# Patient Record
Sex: MALE | Race: WHITE | Employment: FULL TIME | URBAN - METROPOLITAN AREA
[De-identification: names, ages, dates, MRNs, and addresses within clinical notes are randomized per-mention and may not be internally consistent; named-entity substitution may affect disease eponyms.]

---

## 2017-10-19 ENCOUNTER — OFFICE VISIT (OUTPATIENT)
Dept: ORTHOPEDIC SURGERY | Age: 37
End: 2017-10-19

## 2017-10-19 VITALS — WEIGHT: 167.99 LBS | HEIGHT: 69 IN | BODY MASS INDEX: 24.88 KG/M2

## 2017-10-19 DIAGNOSIS — T14.8XXA CRUSH INJURY: ICD-10-CM

## 2017-10-19 DIAGNOSIS — S70.12XA CONTUSION OF LEFT THIGH, INITIAL ENCOUNTER: Primary | ICD-10-CM

## 2017-10-19 PROCEDURE — L1830 KO IMMOB CANVAS LONG PRE OTS: HCPCS | Performed by: PHYSICIAN ASSISTANT

## 2017-10-19 PROCEDURE — 99203 OFFICE O/P NEW LOW 30 MIN: CPT | Performed by: PHYSICIAN ASSISTANT

## 2017-10-19 NOTE — PROGRESS NOTES
Cigarettes. He has been smoking about 0.50 packs per day. He does not have any smokeless tobacco history on file. He reports that he drinks about 3.6 oz of alcohol per week . He reports that he does not use drugs. Family History:   No family history on file. REVIEW OF SYSTEMS:   For new problems, a full review of systems will be found scanned in the patient's chart. CONSTITUTIONAL: Denies unexplained weight loss, fevers, chills   NEUROLOGICAL: Denies unsteady gait or progressive weakness  SKIN: Denies skin changes, delayed healing, rash, itching       PHYSICAL EXAM:    Vitals: Height 5' 9.02\" (1.753 m), weight 167 lb 15.9 oz (76.2 kg). GENERAL EXAM:  · General Apparence: Patient is adequately groomed with no evidence of malnutrition. · Orientation: The patient is oriented to time, place and person. · Mood & Affect:The patient's mood and affect are appropriate       LEFT upper leg PHYSICAL EXAMINATION:  · Inspection:  Is significant swelling of the LEFT upper leg from the groin to the knee, including a knee effusion. There is ecchymosis through the medial aspect of the upper leg and anterior aspect. No abrasion    · Palpation:  The patient is exquisitely tender primarily at the anterior aspect of the proximal mid femur extending to the distal femur, no significant medial or lateral joint line tenderness at the knee. No significant groin tenderness today, the low back is minimally tender on the LEFT      · Range of Motion: He is very limited today with range of motion but can perform a little bit of hip flexion. He cannot perform an independent straight leg raise today secondary to pain    · Strength: Significantly limited secondary to pain, unable to fully assess but there are moderate deficits with hip flexion and knee extension    · Special Tests:  Negative Homans sign, negative Gonsalez test.  Pedal pulses palpable and 2+            · Skin:  There are no rashes, ulcerations or lesions.     · Gait & station: Antalgic favoring the LEFT upper leg      · Additional Examinations: And examination of the contralateral leg does not demonstrate any deformity or injury, strength and tone are normal      Diagnostic Testing:      Views:  4   Location:  LEFT femur   Findings:  Soft tissue swelling is present but I do not notify any definitive fracture    Orders   No orders of the defined types were placed in this encounter. Assessment / Treatment Plan:     1. Significant contusionLEFT femur with possible muscular or tendinous injury    The patient sustained a crush injury to his LEFT upper leg. I recommended an MRI scan of the LEFT femur to rule out a musculoskeletal injury given his deficits with range of motion. We are going to use a knee immobilizer, Ace wrap for compression and he will continue with anti-inflammatory medications and protected weightbearing. He will follow-up with the results of the MRI scan. Obviously he will not be working until we are able to review the results of the scan.

## 2017-10-25 ENCOUNTER — TELEPHONE (OUTPATIENT)
Dept: ORTHOPEDIC SURGERY | Age: 37
End: 2017-10-25

## 2017-10-25 NOTE — TELEPHONE ENCOUNTER
CALLED LVM FOR PATIENT TODAY, STATING MRI IS APPROVED FOR [email or web address], & LEFT # FOR THEM TO CALL & SCHEDULE THAT. & TO MAKE AN FOLLOW UP APPT W/ DR. PUGH AFTER MRI.  LNA

## 2017-11-07 ENCOUNTER — OFFICE VISIT (OUTPATIENT)
Dept: ORTHOPEDIC SURGERY | Age: 37
End: 2017-11-07

## 2017-11-07 VITALS
HEIGHT: 69 IN | DIASTOLIC BLOOD PRESSURE: 90 MMHG | BODY MASS INDEX: 24.88 KG/M2 | SYSTOLIC BLOOD PRESSURE: 148 MMHG | WEIGHT: 167.99 LBS | HEART RATE: 73 BPM

## 2017-11-07 DIAGNOSIS — M25.562 LEFT KNEE PAIN, UNSPECIFIED CHRONICITY: ICD-10-CM

## 2017-11-07 DIAGNOSIS — T14.8XXA HEMATOMA: Primary | ICD-10-CM

## 2017-11-07 PROCEDURE — 99214 OFFICE O/P EST MOD 30 MIN: CPT | Performed by: PHYSICIAN ASSISTANT

## 2017-11-07 PROCEDURE — MISC250 COMPRESSION STOCKING: Performed by: PHYSICIAN ASSISTANT

## 2017-11-07 RX ORDER — INDOMETHACIN 50 MG/1
50 CAPSULE ORAL 3 TIMES DAILY
Qty: 60 CAPSULE | Refills: 2 | Status: SHIPPED | OUTPATIENT
Start: 2017-11-07

## 2017-11-09 ENCOUNTER — TELEPHONE (OUTPATIENT)
Dept: ORTHOPEDIC SURGERY | Age: 37
End: 2017-11-09

## 2017-11-15 ENCOUNTER — HOSPITAL ENCOUNTER (OUTPATIENT)
Dept: PHYSICAL THERAPY | Age: 37
Discharge: HOME | End: 2017-11-30
Admitting: ORTHOPAEDIC SURGERY

## 2017-11-15 NOTE — PLAN OF CARE
[facility], [address] [facility]  [address] [place] [address]  Phone: [phone]  Fax [phone]     Physical Therapy Certification    Dear Referring Practitioner: Rowena Laird,    We had the pleasure of evaluating the following patient for physical therapy services at [address] [place] [address]. A summary of our findings can be found in the initial assessment below. This includes our plan of care. If you have any questions or concerns regarding these findings, please do not hesitate to contact me at the office phone number checked above. Thank you for the referral.       Physician Signature:_______________________________Date:__________________  By signing above (or electronic signature), therapists plan is approved by physician    Patient: Rajan Velasco   : 1980   MRN: [id number]  Referring Physician: Referring Practitioner: Rowena Laird      Evaluation Date: 11/15/2017      Medical Diagnosis Information:  Diagnosis: Left knee hematoma (T14.8XXD), Left knee pain (M25.561)   Treatment Diagnosis: Left knee pain (M25.562)                                         Insurance information: PT Insurance Information: [facility]     Precautions/ Contra-indications: Hematoma left thigh  Latex Allergy:  [x]NO      []YES  Preferred Language for Healthcare:   [x]English       []other:    SUBJECTIVE: Patient stated complaint: Patient reports he injured his left leg in Mid October when he was at work and got sandwiched between two 4000 lb rolls. MRI showed a hematoma and quad strain. Getting an MRI of his knee on Friday. Had a lot of swelling throughout his entire leg, but it has improved. Biggest complaint is discomfort and pain at the top of the knee and into the thigh. No radiating. Feels stiffness in the morning.      Relevant Medical History:No significant PMH  Functional Disability Index:PT G-Codes  Functional Assessment Tool Used: LEFS  Score: 65%  Functional Limitation: Mobility: Walking and moving around  Mobility: Walking and Moving Around Current Status (): At least 60 percent but less than 80 percent impaired, limited or restricted  Mobility: Walking and Moving Around Goal Status (): At least 40 percent but less than 60 percent impaired, limited or restricted    Pain Scale: 6/10  Easing factors: Ice, ibuprofen  Provocative factors: bending knee, straightening, knee, walking, stairs     Type: [x]Constant   []Intermittent  []Radiating []Localized []other:     Numbness/Tingling: occasional present into lower leg depending on what position or chair he sits in. Goes away once he gets up and moves. Occupation/School:     Living Status/Prior Level of Function: Independent with ADLs and IADLs, lifts weights on occasion, does some walking    OBJECTIVE:     ROM LEFT RIGHT   HIP Flex     HIP Abd     HIP Ext     HIP IR     HIP ER     Knee ext -4    Knee Flex 58    Ankle PF     Ankle DF     Ankle In     Ankle Ev     Strength  LEFT RIGHT   HIP Flexors     HIP Abductors     HIP Ext     Hip ER     Knee EXT (quad) Fair -    Knee Flex (HS)     Ankle DF     Ankle PF     Ankle Inv     Ankle EV          Circumference  Mid apex  7 cm prox             Reflexes/Sensation:    []Dermatomes/Myotomes intact    [x]Reflexes equal and normal bilaterally   []Other:    Joint mobility:    []Normal    [x]Hypo   []Hyper    Palpation: TTP mid to distal quad, quad tendon, patellar tendon    Functional Mobility/Transfers: Independent    Posture: Fair    Bandages/Dressings/Incisions: None this date    Gait: (include devices/WB status) Ambulates with left antalgic gait pattern with knee in slight flexion. Orthopedic Special Tests: patellar grind -                       [x] Patient history, allergies, meds reviewed. Medical chart reviewed. See intake form.      Review Of Systems (ROS):  [x]Performed Review of systems (Integumentary, CardioPulmonary, Neurological) by intake and observation. Intake form has been scanned into medical record. Patient has been instructed to contact their primary care physician regarding ROS issues if not already being addressed at this time. Co-morbidities/Complexities (which will affect course of rehabilitation):   [x]None           Arthritic conditions   []Rheumatoid arthritis (M05.9)  []Osteoarthritis (M19.91)   Cardiovascular conditions   []Hypertension (I10)  []Hyperlipidemia (E78.5)  []Angina pectoris (I20)  []Atherosclerosis (I70)   Musculoskeletal conditions   []Disc pathology   []Congenital spine pathologies   []Prior surgical intervention  []Osteoporosis (M81.8)  []Osteopenia (M85.8)   Endocrine conditions   []Hypothyroid (E03.9)  []Hyperthyroid Gastrointestinal conditions   []Constipation (L55.29)   Metabolic conditions   []Morbid obesity (E66.01)  []Diabetes type 1(E10.65) or 2 (E11.65)   []Neuropathy (G60.9)     Pulmonary conditions   []Asthma (J45)  []Coughing   []COPD (J44.9)   Psychological Disorders  []Anxiety (F41.9)  []Depression (F32.9)   []Other:   []Other:          Barriers to/and or personal factors that will affect rehab potential:              []Age  []Sex              []Motivation/Lack of Motivation                        []Co-Morbidities              []Cognitive Function, education/learning barriers              []Environmental, home barriers              []profession/work barriers  []past PT/medical experience  []other:  Justification: Patient is healthy and in pretty good shape. Does smoke, which could slow healing. Falls Risk Assessment (30 days):   [x] Falls Risk assessed and no intervention required.   [] Falls Risk assessed and Patient requires intervention due to being higher risk   TUG score (>12s at risk):     [] Falls education provided, including       G-Codes:  PT G-Codes  Functional Assessment Tool Used: LEFS  Score: 65%  Functional Limitation: Mobility: Walking and moving

## 2017-11-21 ENCOUNTER — HOSPITAL ENCOUNTER (OUTPATIENT)
Dept: PHYSICAL THERAPY | Age: 37
Discharge: HOME | End: 2017-11-21
Admitting: ORTHOPAEDIC SURGERY

## 2017-11-21 NOTE — FLOWSHEET NOTE
[facility], [address]      Physical Therapy  Cancellation/No-show Note  Patient Name:  Ottis Bosworth  :  1980   Date:  2017  Cancelled visits to date:  0  No-shows to date: 1 ()    For today's appointment patient:  []  Cancelled  []  Rescheduled appointment  [x]  No-show     Reason given by patient:  []  Patient ill  []  Conflicting appointment  []  No transportation    []  Conflict with work  []  No reason given  [x]  Other: no call no show    Comments:      Electronically signed by:  Carin Eckert PT

## 2017-11-21 NOTE — FLOWSHEET NOTE
[facility], [address] [place]  Phone: [phone]  Fax [phone]    Physical Therapy Daily Treatment Note  Date:  2017    Patient Name:  David Palencia    :  1980  MRN: [id number]  Restrictions/Precautions:    Medical/Treatment Diagnosis Information:  · Diagnosis: Left knee hematoma (T14.8XXD), Left knee pain (M25.561)  · Treatment Diagnosis: Left knee pain (R37.852)  Insurance/Certification information:  PT Insurance Information: [facility]  Physician Information:  Referring Practitioner: Jimi Lea of care signed (Y/N):     Date of Patient follow up with Physician:     G-Code (if applicable):      Date G-Code Applied:         Progress Note: [x]  Yes  []  No  Next due by: Visit #10       Latex Allergy:  [x]NO      []YES  Preferred Language for Healthcare:   [x]English       []other:    Visit # Insurance Allowable   2 12 visits      Pain level:  4/10     SUBJECTIVE:  \"Got MRI done need to get scheduled to get it read with MD.  Started to do stairs. \"    OBJECTIVE: 27' late for appointment   Observation: tight and hardened quad muscle, floating patella.   Test measurements:  Heel slide     RESTRICTIONS/PRECAUTIONS:     Exercises/Interventions:     Therapeutic Ex Sets/sec Reps Notes   Seated HS stretch 30\" 3       Quad sets 10\" 10x       HL TA with ADD 10\" 10x    SB flexion rolls  30x    HL ADD with bridges 3\" 10    Seated SAQ 2\" 10    SLR ABD  12x                                  incline      bike      AT NV     Manual Intervention      STM quad, ITB x6'     Manual HS stretch 30\" 5                            NMR re-education                                              Therapeutic Exercise and NMR EXR  [x] (92680) Provided verbal/tactile cueing for activities related to strengthening, flexibility, endurance, ROM for improvements in LE, proximal hip, and core control with self care, mobility, lifting, ambulation.  [] (46763) Provided verbal/tactile cueing for activities related to improving balance, coordination, kinesthetic sense, posture, motor skill, proprioception  to assist with LE, proximal hip, and core control in self care, mobility, lifting, ambulation and eccentric single leg control. NMR and Therapeutic Activities:    [] (15420 or 22593) Provided verbal/tactile cueing for activities related to improving balance, coordination, kinesthetic sense, posture, motor skill, proprioception and motor activation to allow for proper function of core, proximal hip and LE with self care and ADLs  [] (79137) Gait Re-education- Provided training and instruction to the patient for proper LE, core and proximal hip recruitment and positioning and eccentric body weight control with ambulation re-education including up and down stairs     Home Exercise Program:    [x] (15605) Reviewed/Progressed HEP activities related to strengthening, flexibility, endurance, ROM of core, proximal hip and LE for functional self-care, mobility, lifting and ambulation/stair navigation   [] (79484)Reviewed/Progressed HEP activities related to improving balance, coordination, kinesthetic sense, posture, motor skill, proprioception of core, proximal hip and LE for self care, mobility, lifting, and ambulation/stair navigation      Manual Treatments:  PROM / STM / Oscillations-Mobs:  G-I, II, III, IV (PA's, Inf., Post.)  [] (92485) Provided manual therapy to mobilize LE, proximal hip and/or LS spine soft tissue/joints for the purpose of modulating pain, promoting relaxation,  increasing ROM, reducing/eliminating soft tissue swelling/inflammation/restriction, improving soft tissue extensibility and allowing for proper ROM for normal function with self care, mobility, lifting and ambulation.      Modalities:    Charges: 1:54-2:55  Timed Code Treatment Minutes: 50   Total Treatment Minutes: 65     [] EVAL (LOW) 19182 (typically 20 minutes face-to-face)  [] EVAL (MOD) 53658 (typically 30 minutes face-to-face)  [] EVAL (HIGH) 44901 (typically 45 minutes face-to-face)  [] RE-EVAL     [x] VV(70014) x  2 (2:05-2:40)  [] IONTO  [] NMR (79453) x      [] VASO  [x] Manual (95888) x  1(1:55-2:05)    [] Other:  [] TA x       [] Mech Traction (64769)  [] ES(attended) (29877)      [x] ES (un) (64472): (2:40-2:55)    GOALS:   Short Term Goals: To be achieved in: 2 weeks  1. Independent in HEP and progression per patient tolerance, in order to prevent re-injury. 2. Patient will have a decrease in pain to facilitate improvement in movement, function, and ADLs as indicated by Functional Deficits.     Long Term Goals: To be achieved in: 8 weeks  1. Disability index score of 30% or less for the LEFS to assist with reaching prior level of function. 2. Patient will demonstrate increased AROM to 0 - 125 degrees in the left knee to allow for proper joint functioning as indicated by patients Functional Deficits. 3. Patient will demonstrate an increase in Strength in left knee flexion and extension to 4+/5 to allow for proper functional mobility as indicated by patients Functional Deficits. 4. Patient will be able to ascend/descend a flight of stairs without pain. 5. Patient will demonstrate a normal gait pattern without pain. (patient specific functional goal)          Progression Towards Functional goals:  [x] Patient is progressing as expected towards functional goals listed. [] Progression is slowed due to complexities listed. [] Progression has been slowed due to co-morbidities. [] Plan just implemented, too soon to assess goals progression  [] Other:     ASSESSMENT:  Pt had better mobility of quad and knee ROM after manuals and ROM exercises.       Treatment/Activity Tolerance:  [x] Patient tolerated treatment well [] Patient limited by fatique  [] Patient limited by pain  [] Patient limited by other medical complications  [] Other:     Prognosis: [] Good [x] Fair  [] Poor    Patient Requires Follow-up: [x] Yes  [] No    PLAN: See eval for full POC; focus on quad contraction  [] Continue per plan of care [] Kaitlin Shields current plan (see comments)  [x] Plan of care initiated [] Hold pending MD visit [] Discharge    Electronically signed by: NELLIE Gregory,[id number]

## 2017-11-24 ENCOUNTER — HOSPITAL ENCOUNTER (OUTPATIENT)
Dept: PHYSICAL THERAPY | Age: 37
Discharge: HOME | End: 2017-11-24
Admitting: ORTHOPAEDIC SURGERY

## 2017-11-24 NOTE — FLOWSHEET NOTE
[] EVAL (LOW) 33107 (typically 20 minutes face-to-face)  [] EVAL (MOD) 97237 (typically 30 minutes face-to-face)  [] EVAL (HIGH) 23749 (typically 45 minutes face-to-face)  [] RE-EVAL     [x] JI(05371) x  2 (4:30-5:00)  [] IONTO  [] NMR (69292) x      [] VASO  [x] Manual (84011) x  14:15-4:30    [] Other:  [] TA x       [] Mech Traction (28411)  [] ES(attended) (61448)      [x] ES (un) (84941): (5:00-5:15 p)    GOALS:   Short Term Goals: To be achieved in: 2 weeks  1. Independent in HEP and progression per patient tolerance, in order to prevent re-injury. 2. Patient will have a decrease in pain to facilitate improvement in movement, function, and ADLs as indicated by Functional Deficits.     Long Term Goals: To be achieved in: 8 weeks  1. Disability index score of 30% or less for the LEFS to assist with reaching prior level of function. 2. Patient will demonstrate increased AROM to 0 - 125 degrees in the left knee to allow for proper joint functioning as indicated by patients Functional Deficits. 3. Patient will demonstrate an increase in Strength in left knee flexion and extension to 4+/5 to allow for proper functional mobility as indicated by patients Functional Deficits. 4. Patient will be able to ascend/descend a flight of stairs without pain. 5. Patient will demonstrate a normal gait pattern without pain. (patient specific functional goal)          Progression Towards Functional goals:  [x] Patient is progressing as expected towards functional goals listed. [] Progression is slowed due to complexities listed. [] Progression has been slowed due to co-morbidities. [] Plan just implemented, too soon to assess goals progression  [] Other:     ASSESSMENT: Pt did well with all TE but was fatigued at the end of session of table exercises which resulted in not initiating AT. Should be ready to initiate at next visit.     Treatment/Activity Tolerance:  [x] Patient tolerated treatment well [] Patient

## 2017-11-27 ENCOUNTER — HOSPITAL ENCOUNTER (OUTPATIENT)
Dept: PHYSICAL THERAPY | Age: 37
Discharge: HOME | End: 2017-11-27
Admitting: ORTHOPAEDIC SURGERY

## 2017-11-27 NOTE — FLOWSHEET NOTE
[facility], [address] [place]  Phone: [phone]  Fax [phone]    Physical Therapy Daily Treatment Note  Date:  2017    Patient Name:  Tino Mejia    :  1980  MRN: [id number]  Restrictions/Precautions:    Medical/Treatment Diagnosis Information:  · Diagnosis: Left knee hematoma (T14.8XXD), Left knee pain (M25.561)  · Treatment Diagnosis: Left knee pain (X94.684)  Insurance/Certification information:  PT Insurance Information: [facility]  Physician Information:  Referring Practitioner: Maribeth Beasley of care signed (Y/N):     Date of Patient follow up with Physician: MRI results to be read 17    G-Code (if applicable):      Date G-Code Applied:         Progress Note: []  Yes  [x]  No  Next due by: Visit #10       Latex Allergy:  [x]NO      []YES  Preferred Language for Healthcare:   [x]English       []other:    Visit # Insurance Allowable   4 12 visits      Pain level: 0 /10     SUBJECTIVE: \"Sore from being at [employer] yesterday. Tender medial quad and inner thigh. \"    OBJECTIVE:   Observation: HS tight, quad tight and congested entire area hip to knee  Test measurements:  None this visit    RESTRICTIONS/PRECAUTIONS:     Exercises/Interventions:     Therapeutic Ex Sets/sec Reps Notes   Warmup bike  5 min    Manuals      Quad sets 10\" 10x    Heel slides 10\" 10x       SB flexion rolls 10\" 15x    HL ADD with bridges 5\" 2x10    Seated SAQ 6\" bolster 5\" 2x10    SLR ABD 1 10    SLR FLX 3 5    Incline stretch 30\" x 3                            Bike FROM slow 5'          AT NV  Held this visit d/t soreness and fatigue with table activities   Manual Intervention      STM quad, ITB x8'     Manual HS stretch and quad stretch 5'                             NMR re-education      Tandem R/L fwd10\"5 each    SLS LLE 10\" 10    FSU with balance LLE on bottom step 15x3\"                           Therapeutic Exercise function with self care, mobility, lifting and ambulation. Modalities: IFC quad with CP x15' seated     Charges: 1:37-2:37  Timed Code Treatment Minutes: 45   Total Treatment Minutes: 60     [] EVAL (LOW) 58010 (typically 20 minutes face-to-face)  [] EVAL (MOD) 79900 (typically 30 minutes face-to-face)  [] EVAL (HIGH) 45878 (typically 45 minutes face-to-face)  [] RE-EVAL     [x] GJ(30709) x  1 (1:49-2:10)  [] IONTO  [x] NMR (54258) x  1 (2:10-2:18)  [] VASO  [x] Manual (96099) x  1(1:37-1:49)   [] Other:  [] TA x       [] Mech Traction (74658)  [] ES(attended) (38424)      [x] ES (un) (38963): (2:18-2:37)    GOALS:   Short Term Goals: To be achieved in: 2 weeks  1. Independent in HEP and progression per patient tolerance, in order to prevent re-injury. 2. Patient will have a decrease in pain to facilitate improvement in movement, function, and ADLs as indicated by Functional Deficits.     Long Term Goals: To be achieved in: 8 weeks  1. Disability index score of 30% or less for the LEFS to assist with reaching prior level of function. 2. Patient will demonstrate increased AROM to 0 - 125 degrees in the left knee to allow for proper joint functioning as indicated by patients Functional Deficits. 3. Patient will demonstrate an increase in Strength in left knee flexion and extension to 4+/5 to allow for proper functional mobility as indicated by patients Functional Deficits. 4. Patient will be able to ascend/descend a flight of stairs without pain. 5. Patient will demonstrate a normal gait pattern without pain. (patient specific functional goal)          Progression Towards Functional goals:  [x] Patient is progressing as expected towards functional goals listed. [] Progression is slowed due to complexities listed. [] Progression has been slowed due to co-morbidities.   [] Plan just implemented, too soon to assess goals progression  [] Other:     ASSESSMENT: Difficulty with SLR (shaky and fatigued)    Treatment/Activity Tolerance:  [x] Patient tolerated treatment well [] Patient limited by fatique  [] Patient limited by pain  [] Patient limited by other medical complications  [] Other:     Prognosis: [x] Good [] Fair  [] Poor    Patient Requires Follow-up: [x] Yes  [] No    PLAN: See eval for full POC;   [x] Continue per plan of care [] Alter current plan (see comments)  [] Plan of care initiated [] Hold pending MD visit [] Discharge    Electronically signed by: Avis Martines, ,[id number]

## 2017-11-30 ENCOUNTER — OFFICE VISIT (OUTPATIENT)
Dept: ORTHOPEDIC SURGERY | Age: 37
End: 2017-11-30

## 2017-11-30 ENCOUNTER — HOSPITAL ENCOUNTER (OUTPATIENT)
Dept: PHYSICAL THERAPY | Age: 37
Discharge: HOME | End: 2017-11-30
Admitting: ORTHOPAEDIC SURGERY

## 2017-11-30 VITALS
HEART RATE: 70 BPM | HEIGHT: 69 IN | BODY MASS INDEX: 24.88 KG/M2 | DIASTOLIC BLOOD PRESSURE: 80 MMHG | SYSTOLIC BLOOD PRESSURE: 137 MMHG | WEIGHT: 167.99 LBS

## 2017-11-30 DIAGNOSIS — M25.562 LEFT KNEE PAIN, UNSPECIFIED CHRONICITY: ICD-10-CM

## 2017-11-30 DIAGNOSIS — S70.12XA CONTUSION OF LEFT THIGH, INITIAL ENCOUNTER: ICD-10-CM

## 2017-11-30 DIAGNOSIS — T14.8XXA HEMATOMA: Primary | ICD-10-CM

## 2017-11-30 DIAGNOSIS — S80.02XA CONTUSION OF LEFT KNEE, INITIAL ENCOUNTER: ICD-10-CM

## 2017-11-30 PROCEDURE — 99213 OFFICE O/P EST LOW 20 MIN: CPT | Performed by: ORTHOPAEDIC SURGERY
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
## 2017-11-30 NOTE — FLOWSHEET NOTE
fwd10\"5 each    SLS LLE 10\" 10        Biphasic NMES 12'  10/10 1.0 ramp                   Therapeutic Exercise and NMR EXR  [x] (11138) Provided verbal/tactile cueing for activities related to strengthening, flexibility, endurance, ROM for improvements in LE, proximal hip, and core control with self care, mobility, lifting, ambulation.  [] (72158) Provided verbal/tactile cueing for activities related to improving balance, coordination, kinesthetic sense, posture, motor skill, proprioception  to assist with LE, proximal hip, and core control in self care, mobility, lifting, ambulation and eccentric single leg control.      NMR and Therapeutic Activities:    [] (97871 or 52713) Provided verbal/tactile cueing for activities related to improving balance, coordination, kinesthetic sense, posture, motor skill, proprioception and motor activation to allow for proper function of core, proximal hip and LE with self care and ADLs  [] (54997) Gait Re-education- Provided training and instruction to the patient for proper LE, core and proximal hip recruitment and positioning and eccentric body weight control with ambulation re-education including up and down stairs     Home Exercise Program:    [x] (25859) Reviewed/Progressed HEP activities related to strengthening, flexibility, endurance, ROM of core, proximal hip and LE for functional self-care, mobility, lifting and ambulation/stair navigation   [] (33447)Reviewed/Progressed HEP activities related to improving balance, coordination, kinesthetic sense, posture, motor skill, proprioception of core, proximal hip and LE for self care, mobility, lifting, and ambulation/stair navigation      Manual Treatments:  PROM / STM / Oscillations-Mobs:  G-I, II, III, IV (PA's, Inf., Post.)  [] (19979) Provided manual therapy to mobilize LE, proximal hip and/or LS spine soft tissue/joints for the purpose of modulating pain, promoting relaxation,  increasing ROM, reducing/eliminating soft

## 2017-12-01 ENCOUNTER — HOSPITAL ENCOUNTER (OUTPATIENT)
Dept: PHYSICAL THERAPY | Age: 37
Discharge: HOME | End: 2017-12-31
Attending: ORTHOPAEDIC SURGERY | Admitting: ORTHOPAEDIC SURGERY

## 2017-12-04 ENCOUNTER — HOSPITAL ENCOUNTER (OUTPATIENT)
Dept: PHYSICAL THERAPY | Age: 37
Discharge: HOME | End: 2017-12-04
Admitting: ORTHOPAEDIC SURGERY

## 2017-12-04 NOTE — FLOWSHEET NOTE
[facility], [address] [place]  Phone: [phone]  Fax [phone]    Physical Therapy Daily Treatment Note  Date:  2017    Patient Name:  Jessa Alonso    :  1980  MRN: [id number]  Restrictions/Precautions:    Medical/Treatment Diagnosis Information:  · Diagnosis: Left knee hematoma (T14.8XXD), Left knee pain (M25.561)  · Treatment Diagnosis: Left knee pain (B13.015)  Insurance/Certification information:  PT Insurance Information: [id number]  Physician Information:  Referring Practitioner: Donald Apple of care signed (Y/N): Y    Date of Patient follow up with Physician: 17    G-Code (if applicable):      Date G-Code Applied:         Progress Note: []  Yes  [x]  No  Next due by: Visit #10       Latex Allergy:  [x]NO      []YES  Preferred Language for Healthcare:   [x]English       []other:    Visit # Insurance Allowable   6 12 visits      Pain level: 0 /10     SUBJECTIVE: \"Doctor said my MRI was clear and no knee injury. May be released to work on the . \"    OBJECTIVE:   Observation:    Test measurements: SB FLX supine 125    RESTRICTIONS/PRECAUTIONS:     Exercises/Interventions:     Therapeutic Ex Sets/sec Reps Notes   Warmup bike  5 min    Seated HS stretch 30\" Manuals      Quad sets 5' with NMES  See below   HEP      SB flexion rolls 10\" 15x    HL ADD with bridges 5\" 3x10       SLR ABD with NMES 3'  See below   SLR FLX with NMES 5'  See below   Incline stretch 30\" x 3    Seated SAQ with NMES 5'  See below   SB bridges with full KE 5\" 3x10              Bike FROM slow 5'    Mini sqtas toucing bottom to wall for positioning Narrow/neutral/wide TRINH 10x each     AT Hold until NV    Manual Intervention      STM quad, ITB, patella 8'     Manual HS stretch and quad stretch 7'                             NMR re-education      Tandem R/L fwd 10\" 5 each    SLS LLE 10\" 10        Biphasic NMES 15'  10/10 1.0 ramp ASSESSMENT: Increased flexibility in quad, increased flexion ROM, decreased quad tissue congestion. Pt cont to fatigue following PT therex so AT was held this visit again. Treatment/Activity Tolerance:  [x] Patient tolerated treatment well [] Patient limited by fatique  [] Patient limited by pain  [] Patient limited by other medical complications  [] Other:     Prognosis: [x] Good [] Fair  [] Poor    Patient Requires Follow-up: [x] Yes  [] No    PLAN: See eval for full POC; AT NV if tolerated.   [x] Continue per plan of care [] Alter current plan (see comments)  [] Plan of care initiated [] Hold pending MD visit [] Discharge    Electronically signed by: NEDRA Sethi,[id number]

## 2017-12-08 ENCOUNTER — HOSPITAL ENCOUNTER (OUTPATIENT)
Dept: PHYSICAL THERAPY | Age: 37
Discharge: HOME | End: 2017-12-08
Admitting: ORTHOPAEDIC SURGERY

## 2017-12-08 NOTE — FLOWSHEET NOTE
[facility], [address] [place]  Phone: [phone]  Fax [phone]    Physical Therapy Daily Treatment Note  Date:  2017    Patient Name:  Ilsa Medellin    :  1980  MRN: [id number]  Restrictions/Precautions:    Medical/Treatment Diagnosis Information:  · Diagnosis: Left knee hematoma (T14.8XXD), Left knee pain (M25.561)  · Treatment Diagnosis: Left knee pain (X63.638)  Insurance/Certification information:  PT Insurance Information: [id number]  Physician Information:  Referring Practitioner: Lokesh Partida of care signed (Y/N): Y    Date of Patient follow up with Physician: 17    G-Code (if applicable):      Date G-Code Applied:         Progress Note: []  Yes  [x]  No  Next due by: Visit #10       Latex Allergy:  [x]NO      []YES  Preferred Language for Healthcare:   [x]English       []other:    Visit # Insurance Allowable   7 12 visits      Pain level: 0 /10     SUBJECTIVE:Pt states his knee/leg feel fine today. Has been resting a lot over the last few days.     OBJECTIVE:   Observation:    Test measurements: SB FLX supine 125    RESTRICTIONS/PRECAUTIONS:     Exercises/Interventions:     Therapeutic Ex Sets/sec Reps Notes   Warmup bike  5 min    Seated HS stretch 30\" 3       Quad sets 5\" x 15  See below   HEP      SB flexion rolls 10\" 15x    HL ADD with bridges 5\" 3x10       SLR ABD  1 10    SLR FLX  2 10    Incline stretch 30\" x 3        SB bridges with full KE 5\" 3x10        Prone TA w/ alt ext  10x ea    Bike FROM slow 5'    Mini sqtas toucing bottom to wall for positioning Narrow/neutral/wide TRINH 10x each     AT Hold until NV    Manual Intervention      STM quad, ITB, patella 8'     Manual HS stretch and quad stretch 4'                             NMR re-education      Tandem R/L fwd 10\" 5 each    SLS LLE 10\" 10                        Therapeutic Exercise and NMR EXR  [x] (97950) Provided verbal/tactile cueing for activities related to strengthening, flexibility, endurance, ROM for improvements in LE, proximal hip, and core control with self care, mobility, lifting, ambulation.  [] (75562) Provided verbal/tactile cueing for activities related to improving balance, coordination, kinesthetic sense, posture, motor skill, proprioception  to assist with LE, proximal hip, and core control in self care, mobility, lifting, ambulation and eccentric single leg control.      NMR and Therapeutic Activities:    [] (99332 or 87677) Provided verbal/tactile cueing for activities related to improving balance, coordination, kinesthetic sense, posture, motor skill, proprioception and motor activation to allow for proper function of core, proximal hip and LE with self care and ADLs  [] (45003) Gait Re-education- Provided training and instruction to the patient for proper LE, core and proximal hip recruitment and positioning and eccentric body weight control with ambulation re-education including up and down stairs     Home Exercise Program:    [x] (30602) Reviewed/Progressed HEP activities related to strengthening, flexibility, endurance, ROM of core, proximal hip and LE for functional self-care, mobility, lifting and ambulation/stair navigation   [] (03011)Reviewed/Progressed HEP activities related to improving balance, coordination, kinesthetic sense, posture, motor skill, proprioception of core, proximal hip and LE for self care, mobility, lifting, and ambulation/stair navigation      Manual Treatments:  PROM / STM / Oscillations-Mobs:  G-I, II, III, IV (PA's, Inf., Post.)  [] (28138) Provided manual therapy to mobilize LE, proximal hip and/or LS spine soft tissue/joints for the purpose of modulating pain, promoting relaxation,  increasing ROM, reducing/eliminating soft tissue swelling/inflammation/restriction, improving soft tissue extensibility and allowing for proper ROM for normal function with self care, mobility, lifting and ambulation. Modalities: ESU/CP x15' quad and knee HV     Charges: (2:06-3:21)  Timed Code Treatment Minutes: 60   Total Treatment Minutes: 75     [] EVAL (LOW) 31535 (typically 20 minutes face-to-face)  [] EVAL (MOD) 68880 (typically 30 minutes face-to-face)  [] EVAL (HIGH) 42542 (typically 45 minutes face-to-face)  [] RE-EVAL     [x] KG(26812) x  2 (9:25-10:00) [] IONTO  [x] NMR (00271) x  1 (9:12-9:25)  [] VASO  [x] Manual (67400) x  1(9:00am-9:12 am)  [] Other:  [] TA x       [] Mech Traction (39630)  [] ES(attended) (85773)      [x] ES (un) (05007):  (10-10:15)    GOALS:   Short Term Goals: To be achieved in: 2 weeks  1. Independent in HEP and progression per patient tolerance, in order to prevent re-injury. Met  2. Patient will have a decrease in pain to facilitate improvement in movement, function, and ADLs as indicated by Functional Deficits. Met     Long Term Goals: To be achieved in: 8 weeks  1. Disability index score of 30% or less for the LEFS to assist with reaching prior level of function. 2. Patient will demonstrate increased AROM to 0 - 125 degrees in the left knee to allow for proper joint functioning as indicated by patients Functional Deficits. Progress  3. Patient will demonstrate an increase in Strength in left knee flexion and extension to 4+/5 to allow for proper functional mobility as indicated by patients Functional Deficits. 4. Patient will be able to ascend/descend a flight of stairs without pain. 5. Patient will demonstrate a normal gait pattern without pain. (patient specific functional goal)   Progress       Progression Towards Functional goals:  [x] Patient is progressing as expected towards functional goals listed. [] Progression is slowed due to complexities listed. [] Progression has been slowed due to co-morbidities. [] Plan just implemented, too soon to assess goals progression  [] Other:     ASSESSMENT: Pt did well with SLR activities without NMES.   Progressing well towards goals.     Treatment/Activity Tolerance:  [x] Patient tolerated treatment well [] Patient limited by fatique  [] Patient limited by pain  [] Patient limited by other medical complications  [] Other:     Prognosis: [x] Good [] Fair  [] Poor    Patient Requires Follow-up: [x] Yes  [] No    PLAN: Cont to work with ATC  [x] Continue per plan of care [] Alter current plan (see comments)  [] Plan of care initiated [] Hold pending MD visit [] Discharge    Electronically signed by: Demond Nguyen, PT,DPT, [id number]
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
## 2017-12-12 ENCOUNTER — HOSPITAL ENCOUNTER (OUTPATIENT)
Dept: PHYSICAL THERAPY | Age: 37
Discharge: HOME | End: 2017-12-12
Admitting: ORTHOPAEDIC SURGERY

## 2017-12-12 NOTE — FLOWSHEET NOTE
improvements in LE, proximal hip, and core control with self care, mobility, lifting, ambulation.  [] (12461) Provided verbal/tactile cueing for activities related to improving balance, coordination, kinesthetic sense, posture, motor skill, proprioception  to assist with LE, proximal hip, and core control in self care, mobility, lifting, ambulation and eccentric single leg control. NMR and Therapeutic Activities:    [] (25490 or 41831) Provided verbal/tactile cueing for activities related to improving balance, coordination, kinesthetic sense, posture, motor skill, proprioception and motor activation to allow for proper function of core, proximal hip and LE with self care and ADLs  [] (47215) Gait Re-education- Provided training and instruction to the patient for proper LE, core and proximal hip recruitment and positioning and eccentric body weight control with ambulation re-education including up and down stairs     Home Exercise Program:    [x] (42747) Reviewed/Progressed HEP activities related to strengthening, flexibility, endurance, ROM of core, proximal hip and LE for functional self-care, mobility, lifting and ambulation/stair navigation   [] (41998)Reviewed/Progressed HEP activities related to improving balance, coordination, kinesthetic sense, posture, motor skill, proprioception of core, proximal hip and LE for self care, mobility, lifting, and ambulation/stair navigation      Manual Treatments:  PROM / STM / Oscillations-Mobs:  G-I, II, III, IV (PA's, Inf., Post.)  [] (80356) Provided manual therapy to mobilize LE, proximal hip and/or LS spine soft tissue/joints for the purpose of modulating pain, promoting relaxation,  increasing ROM, reducing/eliminating soft tissue swelling/inflammation/restriction, improving soft tissue extensibility and allowing for proper ROM for normal function with self care, mobility, lifting and ambulation.      Modalities: ESU/CP x15' quad and knee HV     Charges: Patient limited by fatique  [] Patient limited by pain  [] Patient limited by other medical complications  [] Other:     Prognosis: [x] Good [] Fair  [] Poor    Patient Requires Follow-up: [x] Yes  [] No    PLAN: Cont to work with ATC  [x] Continue per plan of care [] Jackie Nguyễn current plan (see comments)  [] Plan of care initiated [] Hold pending MD visit [] Discharge    Electronically signed by: Danica Alonzo SA,[id number]

## 2017-12-14 ENCOUNTER — HOSPITAL ENCOUNTER (OUTPATIENT)
Dept: PHYSICAL THERAPY | Age: 37
Discharge: HOME | End: 2017-12-14
Admitting: ORTHOPAEDIC SURGERY
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
## 2017-12-14 NOTE — FLOWSHEET NOTE
[facility], [address] [place]  Phone: [phone]  Fax [phone]    Physical Therapy Daily Treatment Note  Date:  2017    Patient Name:  Adelia Campos    :  1980  MRN: [id number]  Restrictions/Precautions:    Medical/Treatment Diagnosis Information:  · Diagnosis: Left knee hematoma (T14.8XXD), Left knee pain (M25.561)  · Treatment Diagnosis: Left knee pain (L27.076)  Insurance/Certification information:  PT Insurance Information: [facility]  DOI:10/14/17  Physician Information:  Referring Practitioner: Sloane Tellez of care signed (Y/N): Y    Date of Patient follow up with Physician: 17    G-Code (if applicable):      Date G-Code Applied:         Progress Note: []  Yes  [x]  No  Next due by: Visit #10       Latex Allergy:  [x]NO      []YES  Preferred Language for Healthcare:   [x]English       []other:    Visit # Insurance Allowable   9 12 visits      Pain level: 0-2 /10     SUBJECTIVE: \"Stiff today. My knee still bothers me. \"    OBJECTIVE:   Observation:  Continued swelling under patella and tightness in infrapatellar tissue.   Test measurements: prone KF:124    RESTRICTIONS/PRECAUTIONS:     Exercises/Interventions:     Therapeutic Ex Reps/Sets/sec Notes   Prone self quad stretch 3x30\" +HEP             HEP               SLR ABD  (1#) 3x10x3\"    SLR FLX  3x10x3\" Shaking after 3 reps of first set but able to maintain extension   Incline stretch 4x30\"       SB bridges with full KE ball between feet 25x5\"       Prone TA  TKE 25x5\"    Prone HSC 3x10x3\"    Bike L2 5' warm up       AT See AT note   Manual Intervention     STM quad, ITB, patella 6' with 1/2 foam roller    Manual HS stretch, ITB stretch and quad stretch 10'                   Tape for patellar lift K tape Pre therex   NMR re-education                            Therapeutic Exercise and NMR EXR  [x] (75661) Provided verbal/tactile cueing for Modalities: ESU/CP x15' quad and knee HV     Charges: (2:33-3:20 PT; 3:20-3:40 AT)  Timed Code Treatment Minutes: 47   Total Treatment Minutes: 62     [] EVAL (LOW) 89452 (typically 20 minutes face-to-face)  [] EVAL (MOD) 28851 (typically 30 minutes face-to-face)  [] EVAL (HIGH) 52453 (typically 45 minutes face-to-face)  [] RE-EVAL     [x] ZF(88462) x  2 (2:49-3:25) [] IONTO  [] NMR (36308) x     [] VASO  [x] Manual (00062) x  1 (2:33-3:40) [] Other:  [] TA x       [] Mech Traction (59993)  [] ES(attended) (45261)      [x] ES (un) (92416):  (3:40-3:55)    GOALS:   Short Term Goals: To be achieved in: 2 weeks  1. Independent in HEP and progression per patient tolerance, in order to prevent re-injury. Met  2. Patient will have a decrease in pain to facilitate improvement in movement, function, and ADLs as indicated by Functional Deficits. Met     Long Term Goals: To be achieved in: 8 weeks  1. Disability index score of 30% or less for the LEFS to assist with reaching prior level of function. 2. Patient will demonstrate increased AROM to 0 - 125 degrees in the left knee to allow for proper joint functioning as indicated by patients Functional Deficits. Progress  3. Patient will demonstrate an increase in Strength in left knee flexion and extension to 4+/5 to allow for proper functional mobility as indicated by patients Functional Deficits. 4. Patient will be able to ascend/descend a flight of stairs without pain. 5. Patient will demonstrate a normal gait pattern without pain. (patient specific functional goal)   Progress       Progression Towards Functional goals:  [x] Patient is progressing as expected towards functional goals listed. [] Progression is slowed due to complexities listed. [] Progression has been slowed due to co-morbidities. [] Plan just implemented, too soon to assess goals progression  [] Other:     ASSESSMENT: Did well with AT work.   Pt would benefit from more functional therex and standing exercises. Treatment/Activity Tolerance:  [x] Patient tolerated treatment well [] Patient limited by fatique  [] Patient limited by pain  [] Patient limited by other medical complications  [] Other:     Prognosis: [x] Good [] Fair  [] Poor    Patient Requires Follow-up: [x] Yes  [] No    PLAN: Cont to work with ATC and increase functional work.   [x] Continue per plan of care [] Alter current plan (see comments)  [] Plan of care initiated [] Hold pending MD visit [] Discharge    Electronically signed by: ALXEANDRIA Cosme,[id number]

## 2017-12-21 ENCOUNTER — HOSPITAL ENCOUNTER (OUTPATIENT)
Dept: PHYSICAL THERAPY | Age: 37
Discharge: HOME | End: 2017-12-21
Admitting: ORTHOPAEDIC SURGERY

## 2017-12-21 NOTE — FLOWSHEET NOTE
tissue/joints for the purpose of modulating pain, promoting relaxation,  increasing ROM, reducing/eliminating soft tissue swelling/inflammation/restriction, improving soft tissue extensibility and allowing for proper ROM for normal function with self care, mobility, lifting and ambulation. Modalities: ESU/CP x15' quad and knee HV     Charges:   Timed Code Treatment Minutes: 75   Total Treatment Minutes: 90     [] EVAL (LOW) 07770 (typically 20 minutes face-to-face)  [] EVAL (MOD) 22261 (typically 30 minutes face-to-face)  [] EVAL (HIGH) 24806 (typically 45 minutes face-to-face)  [] RE-EVAL     [x] CX(49650) x  2 (3:16-3:36 PT/3:36-4:15 PTA)  [] IONTO  [] NMR (91566) x     [] VASO  [x] Manual (62751) x  1 (3:00-3:16)    [] Other:  [] TA x       [] Mech Traction (58554)  [] ES(attended) (59079)      [x] ES (un) (44319):  (4:15-4:30)  GOALS:   Short Term Goals: To be achieved in: 2 weeks  1. Independent in HEP and progression per patient tolerance, in order to prevent re-injury. Met  2. Patient will have a decrease in pain to facilitate improvement in movement, function, and ADLs as indicated by Functional Deficits. Met     Long Term Goals: To be achieved in: 8 weeks  1. Disability index score of 30% or less for the LEFS to assist with reaching prior level of function. 2. Patient will demonstrate increased AROM to 0 - 125 degrees in the left knee to allow for proper joint functioning as indicated by patients Functional Deficits. Progress  3. Patient will demonstrate an increase in Strength in left knee flexion and extension to 4+/5 to allow for proper functional mobility as indicated by patients Functional Deficits. Not met  4. Patient will be able to ascend/descend a flight of stairs without pain. Not met  5.  Patient will demonstrate a normal gait pattern without pain. (patient specific functional goal)   Progress       Progression Towards Functional goals:  [x] Patient is progressing as expected towards

## 2017-12-21 NOTE — FLOWSHEET NOTE
[facility], [address] [place]  Phone: [phone]  Fax [phone]      ATHLETIC TRAINING [address]  Date:  2017    Patient Name:  Lesly Temple    :  1980  MRN: [id number]  Restrictions/Precautions:    Medical/Treatment Diagnosis Information:  ·  L knee hematoma, L knee pain  ·  L knee pain  Physician Information:   Dr. Les Carias Post-op  8 wks  12 wks 16 wks 20 wks   24 wks                            Activity Log                                                  DOS/DOI:                                                    Date: 17   ATC communication Quad was smashed between 2 objects, resulted in contusion. Focus on quad strengthening  Quad fatigued with PT table exercises   Bike      Elliptical      Treadmill      Airdyne            Gastroc stretch      Soleus stretch      Hamstring stretch      ITB stretch      Hip Flexor stretch      Quad stretch      Adductor stretch            Weight Shifting sp                                fp                                tp      Lateral walking (with/w/o TB)            Balance: PEP/Deidre board                     SLS SLS 3D cone reaches 10x SLS 3D cone reaches 10x SLS 3D cone reaches 10x         Star excursion load/explode            Extremity reach UE/LE            Leg Press Huang. 90# 2x10 90# 2x10  90# 2x10                     Ecc. 70# 2x10 70# 2x10  70# 2x10                     Inv. Calf Press Huang.                          Ecc.                          Inv.            ZOEY   Flex                 ABd 45# R/L 2x10 45# R/L 2x10  45# R/L 2x10              ADd                TKE 60# 20x5\" 60# 20x5\" 60# 20x5\"              Ext  60# R/L 2x10  60# R/L 2x10         Steps Up                 Up and Over                 Down  4\" post reach 15x  4\" post reach 15x              Lateral                 Rotation            Squats mini 2x10 20x  2x10                 wall                   BOSU             Lunges:  Lunge to Balance                     Balance to Lunge                     Walking            Knee Extension Bilat. Ecc.                                 Inv. Hamstring Curls Bilat. 35# 2x10 35# 2x10  35# 2x10                              Ecc.                                 Inv.            Soleus Press Bilat. Ecc.                             Inv.                                   Ladders                  Square                 Jump/Hop  Low                        Med.                        High                                  1-on-1 time with PTA                           15'  22'   Modality ES/CP 15' ES/CP 15' ES/CP 15'   Initials                             KRT SA  KRT

## 2017-12-21 NOTE — PLAN OF CARE
[facility], [address] [place] [address]     Physical Therapy 10th Visit Progress Note    Date: 12/21/2017        Date: 12/21/2017                    Physician: Carlyn Mcclure                        Patient: Pauline Sweeney         Diagnosis: Left knee hematoma (T14.8XXD), Left knee pain (M25.561)     Patient has received 10 sessions of Physical Therapy from 11/15/17 to 12/21/17. Functional Questionnaire Score: Initial 65%, Current 65%  Pain reported has decreased from 6/10 to 0-2/10     ROM Initial (R) Initial (L) Current (R) Current (L)   KE   -2   0   KF   58   130                                       Strength           KF   NT   4+/5   KE   NT   4/5   SLR FLX   NT   3+/54/5   HAB   NT                             Functional Limitation G-Code (if applicable):         PT G-Codes  Functional Assessment Tool Used: LEFS  Score: 65%  Functional Limitation: Mobility: Walking and moving around  Mobility: Walking and Moving Around Current Status (): At least 60 percent but less than 80 percent impaired, limited or restricted  Mobility: Walking and Moving Around Goal Status ():  At least 40 percent but less than 60 percent impaired, limited or restricted   Test/tests used to determine % limitation:LEFS total score  Actual Score used to drive % [id number]:85%    Treatment to date:  [x] Therapeutic Exercise    [x] Modalities:  [] Therapeutic Activity             []Ultrasound            [x]Electrical Stimulation  [x] Gait Training     []Cervical Traction    [] Lumbar Traction  [x] Neuromuscular Re-education [x] Cold/hotpack         []Iontophoresis  [x] Instruction in HEP      Other:  [x] Manual Therapy                   []                       ?           []  Assessment:  [x] Improvement noted relative to goals:  [] No Improvement noted related to goals:  Summary/Patient's response to treatment/Additional assessment:  Pt is progressing toward all goals. Anne-Marie Campbell experiences anteiror inferior ptellar and knee pian with squatting and exercises that fatigue the quad.  He is improving in endurance yet quad and LE still fatigues after a PT session. 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     New or Updated Goals (if applicable):  [x] No change to goals established upon initial eval/last progress note:  New Goals:    Electronically signed by:  Jeff Ordonez, [id number]

## 2017-12-22 ENCOUNTER — OFFICE VISIT (OUTPATIENT)
Dept: ORTHOPEDIC SURGERY | Age: 37
End: 2017-12-22

## 2017-12-22 VITALS
HEART RATE: 74 BPM | HEIGHT: 69 IN | BODY MASS INDEX: 24.88 KG/M2 | DIASTOLIC BLOOD PRESSURE: 97 MMHG | WEIGHT: 167.99 LBS | SYSTOLIC BLOOD PRESSURE: 136 MMHG

## 2017-12-22 DIAGNOSIS — M25.562 LEFT KNEE PAIN, UNSPECIFIED CHRONICITY: ICD-10-CM

## 2017-12-22 DIAGNOSIS — T14.8XXA CRUSH INJURY: ICD-10-CM

## 2017-12-22 DIAGNOSIS — S70.12XA CONTUSION OF LEFT THIGH, INITIAL ENCOUNTER: ICD-10-CM

## 2017-12-22 DIAGNOSIS — T14.8XXA HEMATOMA: Primary | ICD-10-CM

## 2017-12-22 PROCEDURE — 99213 OFFICE O/P EST LOW 20 MIN: CPT | Performed by: ORTHOPAEDIC SURGERY

## 2017-12-22 NOTE — PROGRESS NOTES
CHIEF COMPLAINT:    Chief Complaint   Patient presents with    Follow-up     LEFT KNEE        HISTORY OF PRESENT ILLNESS:    Date of injury 10/14/2017              The patient is a 44 y.o. male he presents today for follow-up regarding his LEFT upper leg contusion and knee injury. He is continued to progress in physical therapy particularly with range of motion. He still struggles with quadricep strengthening and patellar intensive activities as expected. He does have some knee swelling and pain toward the end of the day. He is continued ice and elevate and has been compliant with home exercises. No past medical history on file. The pain assessment was noted & is as follows:  Pain Assessment  Location of Pain: Knee  Location Modifiers: Left  Severity of Pain: 1  Quality of Pain: Aching  Duration of Pain: Persistent  Frequency of Pain: Intermittent  Aggravating Factors: Walking, Standing, Stairs, Exercise  Limiting Behavior: Yes  Relieving Factors: Rest  Result of Injury: Yes]      Work Status/Functionality:     Past Medical History: Medical history form was reviewed today & can be found in the media tab  No past medical history on file. Past Surgical History:     Past Surgical History:   Procedure Laterality Date    APPENDECTOMY      LAPAROSCOPIC APPENDECTOMY  6-13-13     Current Medications:     Current Outpatient Prescriptions:     indomethacin (INDOCIN) 50 MG capsule, Take 1 capsule by mouth 3 times daily, Disp: 60 capsule, Rfl: 2  Allergies:  Review of patient's allergies indicates no known allergies. Social History:    reports that he has been smoking Cigarettes. He has been smoking about 0.50 packs per day. He has never used smokeless tobacco. He reports that he drinks about 3.6 oz of alcohol per week . He reports that he does not use drugs. Family History:   No family history on file.     REVIEW OF SYSTEMS:   For new problems, a full review of systems will be found scanned in the patient's chart. CONSTITUTIONAL: Denies unexplained weight loss, fevers, chills   NEUROLOGICAL: Denies unsteady gait or progressive weakness  SKIN: Denies skin changes, delayed healing, rash, itching       PHYSICAL EXAM:    Vitals: Blood pressure (!) 136/97, pulse 74, height 5' 9.02\" (1.753 m), weight 167 lb 15.9 oz (76.2 kg). GENERAL EXAM:  · General Apparence: Patient is adequately groomed with no evidence of malnutrition. · Orientation: The patient is oriented to time, place and person. · Mood & Affect:The patient's mood and affect are appropriate       LEFT knee and upper leg PHYSICAL EXAMINATION:  · Inspection:  Upon inspection, the patient's ecchymosis has resolved, he does have trace swelling at the knee. · Palpation:  There is minimal tenderness throughout the upper thigh musculature. Minimal tenderness at the medial and lateral joint line today and about the patella. The lower leg is nontender      · Range of Motion: 0-125° the knee, hip flexion and internal and external rotation of the LEFT hip is tolerated    · Strength: The extensor mechanism is intact but there is ongoing quadricep atrophy compared to the contralateral side. · Special Tests:  Pain and weakness with patellar intensive squat today. He is otherwise ligamentously stable of the knee            · Skin:  There are no rashes, ulcerations or lesions. · Gait & station: He is able to ambulate unassisted      · Additional Examinations:        Right Lower Extremity: Examination of the right lower extremity does not show any tenderness, deformity or injury. Range of motion is unremarkable. There is no gross instability. There are no rashes, ulcerations or lesions. Strength and tone are normal.      Diagnostic Testing:          Orders   No orders of the defined types were placed in this encounter. Assessment Treatment Plan:     1.   LEFT upper leg hematoma and contusion with associated LEFT knee sprain    The patient's symptoms have improved with physical therapy. However, he is required to stay squat and twist at work and he simply cannot do that yet. I recommended another 4 weeks of formal physical therapy working on quadriceps strengthening. Return around that time with expected return to work date of he reports he does not have any light duty available. I have personally performed and/or participated in the history, exam and medical decision making and agree with all pertinent clinical information. I have also reviewed and agree with the past medical, family and social history unless otherwise noted. This dictation was performed with a verbal recognition program (DRAGON) and it was checked for errors. It is possible that there are still dictated errors within this office note. If so, please bring any errors to my attention for an addendum. All efforts were made to ensure that this office note is accurate.           Nolan Ventura MD

## 2018-01-01 ENCOUNTER — HOSPITAL ENCOUNTER (OUTPATIENT)
Dept: PHYSICAL THERAPY | Age: 38
Discharge: HOME | End: 2018-01-31
Attending: ORTHOPAEDIC SURGERY | Admitting: ORTHOPAEDIC SURGERY

## 2018-01-24 ENCOUNTER — OFFICE VISIT (OUTPATIENT)
Dept: ORTHOPEDIC SURGERY | Age: 38
End: 2018-01-24

## 2018-01-24 VITALS
BODY MASS INDEX: 24.88 KG/M2 | HEART RATE: 71 BPM | DIASTOLIC BLOOD PRESSURE: 79 MMHG | WEIGHT: 167.99 LBS | SYSTOLIC BLOOD PRESSURE: 109 MMHG | HEIGHT: 69 IN

## 2018-01-24 DIAGNOSIS — S80.02XA CONTUSION OF LEFT KNEE, INITIAL ENCOUNTER: ICD-10-CM

## 2018-01-24 DIAGNOSIS — T14.8XXA HEMATOMA: Primary | ICD-10-CM

## 2018-01-24 DIAGNOSIS — S70.12XA CONTUSION OF LEFT THIGH, INITIAL ENCOUNTER: ICD-10-CM

## 2018-01-24 PROCEDURE — 99213 OFFICE O/P EST LOW 20 MIN: CPT | Performed by: PHYSICIAN ASSISTANT

## 2018-01-24 NOTE — PROGRESS NOTES
CHIEF COMPLAINT:    Chief Complaint   Patient presents with    Follow-up     LEFT KNEE        HISTORY OF PRESENT ILLNESS:                The patient is a 44 y.o. male who returns to clinic following up from his Worker's Comp. left knee injury. He suffered a bad contusion after a fall at work. He states his workers compensation just approved physical therapy. He feels as though he is ready to return to work however, would like to continue with his physical therapy. He does want to go back to work on the 29th with no restrictions. He states he does still feel some tightness in his knee when squatting and at times feels unsteady. No past medical history on file. The pain assessment was noted & is as follows:  Pain Assessment  Location of Pain: Knee  Location Modifiers: Left  Severity of Pain: 1  Quality of Pain: Aching  Duration of Pain: Persistent  Frequency of Pain: Intermittent]      Work Status/Functionality:     Past Medical History: Medical history form was reviewed today & can be found in the media tab  No past medical history on file. Past Surgical History:     Past Surgical History:   Procedure Laterality Date    APPENDECTOMY      LAPAROSCOPIC APPENDECTOMY  6-13-13     Current Medications:     Current Outpatient Prescriptions:     indomethacin (INDOCIN) 50 MG capsule, Take 1 capsule by mouth 3 times daily, Disp: 60 capsule, Rfl: 2  Allergies:  Review of patient's allergies indicates no known allergies. Social History:    reports that he has been smoking Cigarettes. He has been smoking about 0.50 packs per day. He has never used smokeless tobacco. He reports that he drinks about 3.6 oz of alcohol per week . He reports that he does not use drugs. Family History:   No family history on file. REVIEW OF SYSTEMS:   For new problems, a full review of systems will be found scanned in the patient's chart.   CONSTITUTIONAL: Denies unexplained weight loss, fevers, chills   NEUROLOGICAL: Denies any errors to my attention for an addendum. All efforts were made to ensure that this office note is accurate.

## 2025-05-31 ENCOUNTER — HOSPITAL ENCOUNTER (EMERGENCY)
Age: 45
Discharge: HOME | End: 2025-05-31
Attending: STUDENT IN AN ORGANIZED HEALTH CARE EDUCATION/TRAINING PROGRAM
Payer: OTHER GOVERNMENT

## 2025-05-31 ENCOUNTER — APPOINTMENT (OUTPATIENT)
Dept: CT IMAGING | Age: 45
End: 2025-05-31
Payer: OTHER GOVERNMENT

## 2025-05-31 VITALS
TEMPERATURE: 98 F | OXYGEN SATURATION: 94 % | HEART RATE: 86 BPM | RESPIRATION RATE: 35 BRPM | SYSTOLIC BLOOD PRESSURE: 118 MMHG | DIASTOLIC BLOOD PRESSURE: 77 MMHG

## 2025-05-31 DIAGNOSIS — K57.32 DIVERTICULITIS OF COLON: ICD-10-CM

## 2025-05-31 DIAGNOSIS — K62.5 RECTAL BLEEDING: Primary | ICD-10-CM

## 2025-05-31 LAB
ALBUMIN SERPL-MCNC: 4.9 G/DL (ref 3.4–5)
ALBUMIN/GLOB SERPL: 2.1 {RATIO} (ref 1.1–2.2)
ALP SERPL-CCNC: 76 U/L (ref 40–129)
ALT SERPL-CCNC: 61 U/L (ref 10–40)
ANION GAP SERPL CALCULATED.3IONS-SCNC: 11 MMOL/L (ref 3–16)
AST SERPL-CCNC: 33 U/L (ref 15–37)
BASOPHILS # BLD: 0.2 K/UL (ref 0–0.2)
BASOPHILS NFR BLD: 1 %
BILIRUB SERPL-MCNC: 0.3 MG/DL (ref 0–1)
BUN SERPL-MCNC: 14 MG/DL (ref 7–20)
CALCIUM SERPL-MCNC: 10 MG/DL (ref 8.3–10.6)
CHLORIDE SERPL-SCNC: 102 MMOL/L (ref 99–110)
CO2 SERPL-SCNC: 26 MMOL/L (ref 21–32)
CREAT SERPL-MCNC: 1.2 MG/DL (ref 0.9–1.3)
DEPRECATED RDW RBC AUTO: 13.3 % (ref 12.4–15.4)
EOSINOPHIL # BLD: 0.4 K/UL (ref 0–0.6)
EOSINOPHIL NFR BLD: 2.2 %
GFR SERPLBLD CREATININE-BSD FMLA CKD-EPI: 76 ML/MIN/{1.73_M2}
GLUCOSE SERPL-MCNC: 156 MG/DL (ref 70–99)
HCT VFR BLD AUTO: 46.1 % (ref 40.5–52.5)
HEMOCCULT SP1 STL QL: ABNORMAL
HGB BLD-MCNC: 16.1 G/DL (ref 13.5–17.5)
LACTATE BLDV-SCNC: 1.6 MMOL/L (ref 0.4–2)
LIPASE SERPL-CCNC: 46 U/L (ref 13–60)
LYMPHOCYTES # BLD: 3.9 K/UL (ref 1–5.1)
LYMPHOCYTES NFR BLD: 23 %
MCH RBC QN AUTO: 29.8 PG (ref 26–34)
MCHC RBC AUTO-ENTMCNC: 35 G/DL (ref 31–36)
MCV RBC AUTO: 85.2 FL (ref 80–100)
MONOCYTES # BLD: 0.8 K/UL (ref 0–1.3)
MONOCYTES NFR BLD: 4.8 %
NEUTROPHILS # BLD: 11.7 K/UL (ref 1.7–7.7)
NEUTROPHILS NFR BLD: 69 %
PLATELET # BLD AUTO: 283 K/UL (ref 135–450)
PMV BLD AUTO: 9.2 FL (ref 5–10.5)
POTASSIUM SERPL-SCNC: 4 MMOL/L (ref 3.5–5.1)
PROT SERPL-MCNC: 7.2 G/DL (ref 6.4–8.2)
RBC # BLD AUTO: 5.42 M/UL (ref 4.2–5.9)
SODIUM SERPL-SCNC: 139 MMOL/L (ref 136–145)
WBC # BLD AUTO: 17 K/UL (ref 4–11)

## 2025-05-31 PROCEDURE — 2580000003 HC RX 258: Performed by: STUDENT IN AN ORGANIZED HEALTH CARE EDUCATION/TRAINING PROGRAM

## 2025-05-31 PROCEDURE — 87040 BLOOD CULTURE FOR BACTERIA: CPT

## 2025-05-31 PROCEDURE — 80053 COMPREHEN METABOLIC PANEL: CPT

## 2025-05-31 PROCEDURE — 96365 THER/PROPH/DIAG IV INF INIT: CPT

## 2025-05-31 PROCEDURE — 96375 TX/PRO/DX INJ NEW DRUG ADDON: CPT

## 2025-05-31 PROCEDURE — 74174 CTA ABD&PLVS W/CONTRAST: CPT

## 2025-05-31 PROCEDURE — 36415 COLL VENOUS BLD VENIPUNCTURE: CPT

## 2025-05-31 PROCEDURE — 85025 COMPLETE CBC W/AUTO DIFF WBC: CPT

## 2025-05-31 PROCEDURE — 6360000004 HC RX CONTRAST MEDICATION: Performed by: STUDENT IN AN ORGANIZED HEALTH CARE EDUCATION/TRAINING PROGRAM

## 2025-05-31 PROCEDURE — 83605 ASSAY OF LACTIC ACID: CPT

## 2025-05-31 PROCEDURE — 99285 EMERGENCY DEPT VISIT HI MDM: CPT

## 2025-05-31 PROCEDURE — 6360000002 HC RX W HCPCS: Performed by: STUDENT IN AN ORGANIZED HEALTH CARE EDUCATION/TRAINING PROGRAM

## 2025-05-31 PROCEDURE — 83690 ASSAY OF LIPASE: CPT

## 2025-05-31 PROCEDURE — 6360000002 HC RX W HCPCS

## 2025-05-31 PROCEDURE — 82270 OCCULT BLOOD FECES: CPT

## 2025-05-31 RX ORDER — ONDANSETRON 4 MG/1
4 TABLET, ORALLY DISINTEGRATING ORAL 3 TIMES DAILY PRN
Qty: 21 TABLET | Refills: 0 | Status: SHIPPED | OUTPATIENT
Start: 2025-05-31

## 2025-05-31 RX ORDER — IOPAMIDOL 755 MG/ML
75 INJECTION, SOLUTION INTRAVASCULAR
Status: COMPLETED | OUTPATIENT
Start: 2025-05-31 | End: 2025-05-31

## 2025-05-31 RX ORDER — DICYCLOMINE HYDROCHLORIDE 10 MG/1
10 CAPSULE ORAL
Qty: 120 CAPSULE | Refills: 0 | Status: SHIPPED | OUTPATIENT
Start: 2025-05-31

## 2025-05-31 RX ORDER — PANTOPRAZOLE SODIUM 40 MG/10ML
40 INJECTION, POWDER, LYOPHILIZED, FOR SOLUTION INTRAVENOUS ONCE
Status: COMPLETED | OUTPATIENT
Start: 2025-05-31 | End: 2025-05-31

## 2025-05-31 RX ADMIN — PIPERACILLIN AND TAZOBACTAM 4500 MG: 4; .5 INJECTION, POWDER, LYOPHILIZED, FOR SOLUTION INTRAVENOUS at 20:32

## 2025-05-31 RX ADMIN — PANTOPRAZOLE SODIUM 40 MG: 40 INJECTION, POWDER, FOR SOLUTION INTRAVENOUS at 19:31

## 2025-05-31 RX ADMIN — IOPAMIDOL 75 ML: 755 INJECTION, SOLUTION INTRAVENOUS at 20:23

## 2025-05-31 ASSESSMENT — ENCOUNTER SYMPTOMS: BLOOD IN STOOL: 1

## 2025-05-31 ASSESSMENT — LIFESTYLE VARIABLES
HOW OFTEN DO YOU HAVE A DRINK CONTAINING ALCOHOL: 4 OR MORE TIMES A WEEK
HOW MANY STANDARD DRINKS CONTAINING ALCOHOL DO YOU HAVE ON A TYPICAL DAY: 3 OR 4

## 2025-05-31 ASSESSMENT — PAIN SCALES - GENERAL: PAINLEVEL_OUTOF10: 1

## 2025-05-31 NOTE — ED PROVIDER NOTES
Emergency Department Attending Resident Note  Location: [facility] EMERGENCY DEPARTMENT  5/31/2025       Pt Name: Jabari Mosley  MRN: [id number]  Birthdate 1980    Date of evaluation: 5/31/2025  Provider: Edgar Montoya MD  PCP: No primary care provider on file.    Note Started: 7:27 PM EDT 5/31/25    CHIEF COMPLAINT  Chief Complaint   Patient presents with    Rectal Bleeding     Pt reports he woke up had a BM and then went to cut the grass after cutting the grass he had multiple bloody Bowel Movement. Pt complains of right lower abdomen pain. Patient states this has been happening for the past year, and has not been seen for it       ROOM: 09    HISTORY OF PRESENT ILLNESS:  History obtained by patient. Limitations to history : None.     Jabari Mosley is a 44 y.o. male with a significant PMHx of diabetes, HTN, HLD presenting with chief complaints of rectal bleeding.  Per patient and his spouse he had approximately 3 episodes of bloody bowel movements since 6 PM this evening.  Per wife he was found to be clammy, diaphoretic and pale prior to each episode.  He is also endorsing some right lower quadrant pain.  He reports this has been happening for the past year, also reporting narrow stools.  Denies hematochezia.  Denies chronic NSAID use.  Endorses alcohol use, drinks approximately 2-3 beers most nights.  Nursing Notes were all reviewed and agreed with or any disagreements were addressed in the HPI.      MEDICAL HISTORY  Past Medical History:   Diagnosis Date    Diabetes mellitus (HCC)     Hypertension         SURGICAL HISTORY  Past Surgical History:   Procedure Laterality Date    APPENDECTOMY      LAPAROSCOPIC APPENDECTOMY  6-13-13       CURRENT MEDICATIONS  Previous Medications    INDOMETHACIN (INDOCIN) 50 MG CAPSULE    Take 1 capsule by mouth 3 times daily       ALLERGIES  Patient has no known allergies.    FAMILYHISTORY  No family history on file.    SOCIAL HISTORY  Social History  AUTO DIFFERENTIAL - Abnormal; Notable for the following components:       Result Value    WBC 17.0 (*)     Neutrophils Absolute 11.7 (*)     All other components within normal limits   BLOOD OCCULT STOOL SCREEN #1 - Abnormal; Notable for the following components:    Occult Blood Screening   (*)     Value: Result: POSITIVE  Normal range: Negative      All other components within normal limits    Narrative:     ORDER#: [id number]                          ORDERED BY: ASHVIN MAGALLANES  SOURCE: Stool Rectum                       COLLECTED:  05/31/25 19:09  ANTIBIOTICS AT [facility].:                      RECEIVED :  05/31/25 19:22   CULTURE, BLOOD 1   CULTURE, BLOOD 2   COMPREHENSIVE METABOLIC PANEL W/ REFLEX TO MG FOR LOW K   LIPASE   LACTIC ACID       When ordered, only abnormal lab results are displayed. All other labs were within normal range or not returned as of this dictation.      Independent EKG Interpretation: Sinus bradycardia      RADIOLOGY:    Plain radiographic images are visualized and preliminarily interpreted by the ED Provider with the below findings:     Non-plain film images such as CT, Ultrasound and MRI are read by the radiologist. Interpretation per the Radiologist below, if available at the time of this note:  CTA ABDOMEN PELVIS W CONTRAST    (Results Pending)         PROCEDURES:  Unless otherwise noted below, none.    Procedures      MEDICATIONS:   Medications   piperacillin-tazobactam (ZOSYN) 4,500 mg in sodium chloride 0.9 % 100 mL IVPB (addEASE) (has no administration in time range)   pantoprazole (PROTONIX) injection 40 mg (40 mg IntraVENous Given 5/31/25 1931)       _____________________________________    MEDICAL DECISION MAKING:     Patient is a 44 y.o. male with a significant PMHx of diabetes, hypertension, PTSD presenting with rectal bleeding., CBC showing elevated white count with neutrophilia.  Differential includes acute infection, peptic ulcer disease, diverticular bleed, malignancy.  No

## 2025-05-31 NOTE — ED PROVIDER NOTES
ED Attending Attestation Note    I personally saw the patient and made/approved the management plan and take responsibility for patient management.          Briefly, 44 y.o. male with past surgical history of appendectomy presents with right lower quadrant abdominal cramping the last couple days worsened today when he was mowing his lawn he had a few episodes of bright red blood from stool.  Has had a prior episodes in the past but never normal colonoscopy does not use heavy NSAIDs, does drink approximately 2 beers per day occasionally.    No significant family history of colon cancer      Focused exam:   Physical Exam  Vitals and nursing note reviewed.   Constitutional:       General: He is not in acute distress.     Appearance: He is not ill-appearing, toxic-appearing or diaphoretic.   HENT:      Head: Normocephalic and atraumatic.      Right Ear: External ear normal.      Left Ear: External ear normal.      Nose: Nose normal.   Eyes:      Conjunctiva/sclera: Conjunctivae normal.   Cardiovascular:      Rate and Rhythm: Normal rate and regular rhythm.   Pulmonary:      Effort: Pulmonary effort is normal. No respiratory distress.   Abdominal:      General: There is no distension.      Tenderness: There is abdominal tenderness. There is no guarding.      Comments: Right lower quadrant is palpation   Skin:     General: Skin is warm and dry.      Capillary Refill: Capillary refill takes less than 2 seconds.   Neurological:      Mental Status: He is alert.            Imaging:   CTA ABDOMEN PELVIS W WO CONTRAST   Final Result         1. No evidence of active gastric intestinal bleeding   2. Colonic diverticulosis. Cannot include mild diverticulitis.   3. Hepatic steatosis      Electronically signed by Aden Green               I independently interpreted the following studies:   ECG:       External Documentation Reviewed: Previous patient encounter documents & history available on EMR was reviewed:   Record from: Patient

## 2025-06-01 LAB
BACTERIA BLD CULT ORG #2: NORMAL
BACTERIA BLD CULT: NORMAL

## 2025-06-04 LAB
BACTERIA BLD CULT ORG #2: NORMAL
BACTERIA BLD CULT: NORMAL

## 2025-06-05 ENCOUNTER — RESULTS FOLLOW-UP (OUTPATIENT)
Dept: EMERGENCY DEPT | Age: 45
End: 2025-06-05